# Patient Record
Sex: FEMALE | Race: WHITE
[De-identification: names, ages, dates, MRNs, and addresses within clinical notes are randomized per-mention and may not be internally consistent; named-entity substitution may affect disease eponyms.]

---

## 2017-01-09 ENCOUNTER — HOSPITAL ENCOUNTER (OUTPATIENT)
Dept: HOSPITAL 45 - C.MAMM | Age: 57
Discharge: HOME | End: 2017-01-09
Attending: OBSTETRICS & GYNECOLOGY
Payer: COMMERCIAL

## 2017-01-09 DIAGNOSIS — R92.8: Primary | ICD-10-CM

## 2017-01-09 NOTE — MAMMOGRAPHY REPORT
BILATERAL DIGITAL DIAGNOSTIC MAMMOGRAM TOMOSYNTHESIS WITH CAD AND TARGETED RIGHT ULTRASOUND: 1/9/201
7

CLINICAL HISTORY: 1 year follow-up for an asymmetry in the far posterior superior right breast.  Dara perez is also status post benign ultrasound-guided core needle biopsy in the 1:00 right breast.  Chela chung bilateral screening mammogram.  





TECHNIQUE:  Bilateral breast tomosynthesis in addition to standard 2D mammography was performed. Cur
rent study was also evaluated with a Computer Aided Detection (CAD) system.  



COMPARISON: Comparison is made to exams dated:  8/1/2016 mammogram, 1/25/2016 mammogram, 1/25/2016 u
ltrasound biopsy, 1/18/2016 mammogram, 1/18/2016 ultrasound, and 1/5/2016 mammogram - [facility].   



BREAST COMPOSITION:  The tissue of both breasts is heterogeneously dense, which may obscure small ma
sses.  



FINDINGS: There is a stable ribbon shaped metallic biopsy marker in the 1:00 posterior right breast.
  There are benign rim calcifications within the right breast.  The asymmetry in the far superior fa
r posterior right breast only seen on the MLO view from 01/05/2016 is no longer identified, confirmi
ng normal overlapping fibroglandular tissue.  No suspicious mass, architectural distortion or cluste
r of microcalcifications is seen bilaterally.  



Targeted ultrasound was performed in the 1:00 right breast in the area of previously biopsied hypoec
hoic lesion.  No discrete solid or cystic mass is any longer identified, confirming benignity.



IMPRESSION:  ACR BI-RADS CATEGORY 2: BENIGN, TARGETED ULTRASOUND ACR BI-RADS CATEGORY 2: BENIGN 

Stable bilateral mammograms, without mammographic evidence of malignancy.  An asymmetry in the super
ior posterior right breast is no longer identified.  Stable post biopsy changes in the 1:00 right br
east.  There is no mammographic or targeted sonographic evidence of malignancy.  A 1 year screening 
mammogram is recommended.  The patient has been verbally notified of the results.  





Approximately 10% of breast cancers are not detected with mammography. A negative mammographic repor
t should not delay biopsy if a clinically suggestive mass is present.



Oanh Jain M.D.          

ay/:1/9/2017 14:24:41  



Imaging Technologist: Yolie Cee RT(R)(M), [facility]

letter sent: Normal 1/2  

BI-RADS Code: ACR BI-RADS Category 2: Benign  Ultrasound BI-RADS: ACR BI-RADS Category 2: Benign

## 2018-03-19 ENCOUNTER — HOSPITAL ENCOUNTER (OUTPATIENT)
Dept: HOSPITAL 45 - C.MAMM | Age: 58
Discharge: HOME | End: 2018-03-19
Attending: OBSTETRICS & GYNECOLOGY
Payer: COMMERCIAL

## 2018-03-19 DIAGNOSIS — Z12.31: Primary | ICD-10-CM

## 2018-03-19 NOTE — MAMMOGRAPHY REPORT
BILATERAL DIGITAL SCREENING MAMMOGRAM TOMOSYNTHESIS WITH CAD: 3/19/2018

CLINICAL HISTORY: Routine screening.  





TECHNIQUE:  Breast tomosynthesis in addition to standard 2D mammography was performed. Current study 
was also evaluated with a Computer Aided Detection (CAD) system.  



COMPARISON: Comparison is made to exams dated:  1/9/2017 mammogram, 8/1/2016 mammogram, 1/18/2016 levi
mogram, 1/5/2016 mammogram, 11/19/2014 mammogram, and 11/18/2013 mammogram - [facility]
nter.   



BREAST COMPOSITION:  The tissue of both breasts is heterogeneously dense, which may obscure small mas
ses.  



FINDINGS:  No suspicious masses, calcifications, or areas of architectural distortion are noted in ei
ther breast. There has been no significant interval change compared to prior exams.  A biopsy clip is
 again noted within the right 12:00 breast.



IMPRESSION:  ACR BI-RADS CATEGORY 2: BENIGN

There is no mammographic evidence of malignancy. A 1 year screening mammogram is recommended.  The pa
tient will receive written notification of the results.  





Approximately 10% of breast cancers are not detected with mammography. A negative mammographic report
 should not delay biopsy if a clinically suggestive mass is present.



Chiqui Browning M.D.          

ah/:3/19/2018 07:29:03  



Imaging Technologist: Karson MUSA)(M), [facility]

letter sent: Normal 1/2  

BI-RADS Code: ACR BI-RADS Category 2: Benign

## 2018-04-20 ENCOUNTER — HOSPITAL ENCOUNTER (OUTPATIENT)
Dept: HOSPITAL 45 - C.PAPS | Age: 58
Discharge: HOME | End: 2018-04-20
Attending: OBSTETRICS & GYNECOLOGY
Payer: COMMERCIAL

## 2018-04-20 DIAGNOSIS — Z12.4: Primary | ICD-10-CM
